# Patient Record
Sex: FEMALE | Race: WHITE | NOT HISPANIC OR LATINO | URBAN - METROPOLITAN AREA
[De-identification: names, ages, dates, MRNs, and addresses within clinical notes are randomized per-mention and may not be internally consistent; named-entity substitution may affect disease eponyms.]

---

## 2017-03-01 ENCOUNTER — IMPORTED ENCOUNTER (OUTPATIENT)
Dept: URBAN - METROPOLITAN AREA CLINIC 31 | Facility: CLINIC | Age: 57
End: 2017-03-01

## 2017-03-01 PROBLEM — H25.13: Noted: 2017-03-01

## 2017-03-01 PROBLEM — D31.32: Noted: 2017-03-01

## 2017-03-01 PROCEDURE — 92015 DETERMINE REFRACTIVE STATE: CPT

## 2017-03-01 PROCEDURE — 92014 COMPRE OPH EXAM EST PT 1/>: CPT

## 2017-03-01 PROCEDURE — 92310 CONTACT LENS FITTING OU: CPT

## 2017-03-01 NOTE — PATIENT DISCUSSION
1.  Refractive error Change glasses. Dispense trial contacts OU. To discontinue and call if any problems. 2.  Nuclear Sclerotic Cataract OU: Explained how cataracts can effect vision. Recommend clinical observation. The patient was advised to contact us if any change or worsening of vision. 3. 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 Advise continued observation. Discussed in detail with the patient. The patient voiced understanding. 4.  Return for an appointment in 12 months for comprehensive exam. with Dr. Kate Ferrari.

## 2018-04-04 ENCOUNTER — IMPORTED ENCOUNTER (OUTPATIENT)
Dept: URBAN - METROPOLITAN AREA CLINIC 31 | Facility: CLINIC | Age: 58
End: 2018-04-04

## 2018-04-04 PROBLEM — H25.13: Noted: 2018-04-04

## 2018-04-04 PROBLEM — D31.32: Noted: 2018-04-04

## 2018-04-04 PROCEDURE — 92015 DETERMINE REFRACTIVE STATE: CPT

## 2018-04-04 PROCEDURE — V2521 CNTCT LENS HYDROPHILIC TORIC: HCPCS

## 2018-04-04 PROCEDURE — 92310 CONTACT LENS FITTING OU: CPT

## 2018-04-04 PROCEDURE — 92014 COMPRE OPH EXAM EST PT 1/>: CPT

## 2018-04-04 PROCEDURE — V2520 CONTACT LENS HYDROPHILIC: HCPCS

## 2019-04-05 ENCOUNTER — IMPORTED ENCOUNTER (OUTPATIENT)
Dept: URBAN - METROPOLITAN AREA CLINIC 31 | Facility: CLINIC | Age: 59
End: 2019-04-05

## 2019-04-05 PROBLEM — H25.13: Noted: 2019-04-05

## 2019-04-05 PROBLEM — D31.32: Noted: 2019-04-05

## 2019-04-05 PROCEDURE — 92310 CONTACT LENS FITTING OU: CPT

## 2019-04-05 PROCEDURE — 92014 COMPRE OPH EXAM EST PT 1/>: CPT

## 2019-04-05 PROCEDURE — 92015 DETERMINE REFRACTIVE STATE: CPT

## 2019-04-05 PROCEDURE — V2520 CONTACT LENS HYDROPHILIC: HCPCS

## 2019-04-05 PROCEDURE — V2521 CNTCT LENS HYDROPHILIC TORIC: HCPCS

## 2020-05-21 ENCOUNTER — IMPORTED ENCOUNTER (OUTPATIENT)
Dept: URBAN - METROPOLITAN AREA CLINIC 31 | Facility: CLINIC | Age: 60
End: 2020-05-21

## 2020-05-21 PROBLEM — D31.32: Noted: 2020-05-21

## 2020-05-21 PROBLEM — H25.13: Noted: 2020-05-21

## 2020-05-21 PROCEDURE — 92014 COMPRE OPH EXAM EST PT 1/>: CPT

## 2020-05-21 PROCEDURE — V2520 CONTACT LENS HYDROPHILIC: HCPCS

## 2020-05-21 PROCEDURE — V2521 CNTCT LENS HYDROPHILIC TORIC: HCPCS

## 2020-05-21 PROCEDURE — 92310 CONTACT LENS FITTING OU: CPT

## 2021-06-01 ENCOUNTER — IMPORTED ENCOUNTER (OUTPATIENT)
Dept: URBAN - METROPOLITAN AREA CLINIC 31 | Facility: CLINIC | Age: 61
End: 2021-06-01

## 2021-06-01 PROBLEM — D31.32: Noted: 2021-06-01

## 2021-06-01 PROBLEM — H25.13: Noted: 2021-06-01

## 2021-06-01 PROCEDURE — 92014 COMPRE OPH EXAM EST PT 1/>: CPT

## 2021-06-01 PROCEDURE — 92310 CONTACT LENS FITTING OU: CPT

## 2021-06-01 PROCEDURE — V2521 CNTCT LENS HYDROPHILIC TORIC: HCPCS

## 2021-06-01 PROCEDURE — V2520 CONTACT LENS HYDROPHILIC: HCPCS

## 2021-06-01 PROCEDURE — 92250 FUNDUS PHOTOGRAPHY W/I&R: CPT

## 2021-06-01 NOTE — PATIENT DISCUSSION
1.  Refractive error:2.  Nuclear Sclerotic Cataract OU: Explained how cataracts can effect vision. Recommend clinical observation. The patient was advised to contact us if any change or worsening of vision. 3. 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 Advise continued observation. Discussed in detail with the patient. The patient voiced understanding.

## 2022-04-02 ASSESSMENT — TONOMETRY
OD_IOP_MMHG: 15
OD_IOP_MMHG: 16
OS_IOP_MMHG: 16
OD_IOP_MMHG: 15
OS_IOP_MMHG: 16
OS_IOP_MMHG: 16
OD_IOP_MMHG: 15
OS_IOP_MMHG: 16
OS_IOP_MMHG: 16
OD_IOP_MMHG: 16

## 2022-04-02 ASSESSMENT — VISUAL ACUITY
OS_CC: J114"
OD_SC: 20/25
OD_SC: 20/20-1
OS_CC: 20/60
OS_CC: J114"
OD_CC: 20/60-1
OS_CC: J214"
OS_CC: J1
OD_SC: 20/30-1

## 2022-06-13 ENCOUNTER — COMPREHENSIVE EXAM (OUTPATIENT)
Dept: URBAN - METROPOLITAN AREA CLINIC 31 | Facility: CLINIC | Age: 62
End: 2022-06-13

## 2022-06-13 DIAGNOSIS — H25.13: ICD-10-CM

## 2022-06-13 DIAGNOSIS — H52.4: ICD-10-CM

## 2022-06-13 DIAGNOSIS — D31.32: ICD-10-CM

## 2022-06-13 PROCEDURE — 92250 FUNDUS PHOTOGRAPHY W/I&R: CPT

## 2022-06-13 PROCEDURE — 92014 COMPRE OPH EXAM EST PT 1/>: CPT

## 2022-06-13 PROCEDURE — 92310-1 LEVEL 1 CONTACT LENS MANAGEMENT

## 2022-06-13 PROCEDURE — 92015 DETERMINE REFRACTIVE STATE: CPT

## 2022-06-13 ASSESSMENT — TONOMETRY
OS_IOP_MMHG: 12
OD_IOP_MMHG: 12

## 2022-06-13 ASSESSMENT — VISUAL ACUITY
OD_CC: 20/25
OS_CC: J2

## 2022-12-16 NOTE — PATIENT DISCUSSION
"Pt. elects [facility].  OS only at this time.  Pt. understands she will see rings around lights at night.  Pt. wishes to have great NV/IV and doesn't mind if she has ""functional"" distance.  Recommend possible CL for OD until IOL Sx is necessary.  Pt. to consider [facility] when IOL Sx is necessary if she wants best NV/IV.  If she wishes to have more DV

## 2025-08-15 ENCOUNTER — NEW PATIENT (OUTPATIENT)
Age: 65
End: 2025-08-15

## 2025-08-15 DIAGNOSIS — D31.32: ICD-10-CM

## 2025-08-15 DIAGNOSIS — H52.4: ICD-10-CM

## 2025-08-15 DIAGNOSIS — H43.812: ICD-10-CM

## 2025-08-15 DIAGNOSIS — H25.13: ICD-10-CM

## 2025-08-15 PROCEDURE — 92250 FUNDUS PHOTOGRAPHY W/I&R: CPT

## 2025-08-15 PROCEDURE — 92004 COMPRE OPH EXAM NEW PT 1/>: CPT

## 2025-08-15 PROCEDURE — 92015 DETERMINE REFRACTIVE STATE: CPT

## 2025-08-27 ENCOUNTER — CONTACT LENSES/GLASSES VISIT (OUTPATIENT)
Age: 65
End: 2025-08-27

## 2025-08-27 DIAGNOSIS — H52.4: ICD-10-CM

## 2025-08-27 PROCEDURE — 92310-3 LEVEL 3 SOFT LENS UPDATE
